# Patient Record
Sex: MALE | Race: WHITE | NOT HISPANIC OR LATINO | Employment: FULL TIME | URBAN - METROPOLITAN AREA
[De-identification: names, ages, dates, MRNs, and addresses within clinical notes are randomized per-mention and may not be internally consistent; named-entity substitution may affect disease eponyms.]

---

## 2021-05-21 PROBLEM — F10.10 CHRONIC ALCOHOL ABUSE: Status: ACTIVE | Noted: 2021-05-21

## 2021-05-21 PROBLEM — K86.1 CHRONIC PANCREATITIS: Status: ACTIVE | Noted: 2021-05-21

## 2021-05-21 PROBLEM — K29.70 GASTRITIS: Status: ACTIVE | Noted: 2021-05-21

## 2021-05-21 PROBLEM — K62.5 RECTAL BLEEDING: Status: ACTIVE | Noted: 2021-05-21

## 2021-05-21 PROBLEM — D64.9 SYMPTOMATIC ANEMIA: Status: ACTIVE | Noted: 2021-05-21

## 2021-05-21 PROBLEM — N39.0 UTI (URINARY TRACT INFECTION): Status: ACTIVE | Noted: 2021-05-21

## 2021-05-21 PROBLEM — E87.6 HYPOKALEMIA: Status: ACTIVE | Noted: 2021-05-21

## 2021-05-21 PROBLEM — G47.00 INSOMNIA: Status: ACTIVE | Noted: 2021-05-21

## 2021-05-22 PROBLEM — I95.9 HYPOTENSION: Status: ACTIVE | Noted: 2021-05-22

## 2021-05-22 PROBLEM — E87.8 HYPERCHLOREMIA: Status: ACTIVE | Noted: 2021-05-22

## 2021-05-22 PROBLEM — D62 ACUTE BLOOD LOSS ANEMIA: Status: ACTIVE | Noted: 2021-05-22

## 2021-05-22 PROBLEM — K92.2 GI BLEED: Status: ACTIVE | Noted: 2021-05-22

## 2021-05-22 PROBLEM — I10 HYPERTENSION: Status: ACTIVE | Noted: 2021-05-22

## 2021-05-22 PROBLEM — E83.51 HYPOCALCEMIA: Status: ACTIVE | Noted: 2021-05-22

## 2021-05-23 PROBLEM — E87.6 HYPOKALEMIA: Status: RESOLVED | Noted: 2021-05-21 | Resolved: 2021-05-23

## 2021-05-24 PROBLEM — E83.51 HYPOCALCEMIA: Status: RESOLVED | Noted: 2021-05-22 | Resolved: 2021-05-24

## 2021-05-24 PROBLEM — E87.8 HYPERCHLOREMIA: Status: RESOLVED | Noted: 2021-05-22 | Resolved: 2021-05-24

## 2021-05-24 PROBLEM — K62.5 RECTAL BLEEDING: Status: RESOLVED | Noted: 2021-05-21 | Resolved: 2021-05-24

## 2021-05-24 PROBLEM — I95.9 HYPOTENSION: Status: RESOLVED | Noted: 2021-05-22 | Resolved: 2021-05-24

## 2021-05-25 PROBLEM — K22.10 ULCER OF ESOPHAGUS WITHOUT BLEEDING: Status: ACTIVE | Noted: 2021-05-25

## 2024-09-19 PROBLEM — I25.10 CAD (CORONARY ATHEROSCLEROTIC DISEASE): Status: ACTIVE | Noted: 2024-09-19

## 2024-10-11 ENCOUNTER — SOCIAL WORK (OUTPATIENT)
Dept: ADMINISTRATIVE | Facility: OTHER | Age: 64
End: 2024-10-11

## 2024-10-14 ENCOUNTER — SOCIAL WORK (OUTPATIENT)
Dept: ADMINISTRATIVE | Facility: OTHER | Age: 64
End: 2024-10-14

## 2024-10-14 NOTE — PROGRESS NOTES
10-11-24 Referral received from RN (Maribell MTZ) requesting assistance be provided to patient with identifying rehab facilities, to provide service for patient  after scheduled surgery (10-22-24) .   (TERRI) spoke with patient after doctors' visit.  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  Patient is a  and would like to be  reestablished .  SW agreed to address patients request by calling patients insurance( [employer] )  to identify  rehab facilities in the [facility] [place] [facility] and the process of getting services in place for patient.  SW will provide updates to patient.  Monitoring will continue.  Miranda Rome-[facility] [place]  [phone]

## 2024-10-16 ENCOUNTER — SOCIAL WORK (OUTPATIENT)
Dept: ADMINISTRATIVE | Facility: OTHER | Age: 64
End: 2024-10-16

## 2024-10-16 NOTE — PROGRESS NOTES
10-16-24 [facility] to [facility] @ [phone]-(Eligibility Dept.) no answer, message left asking that call to  be returned.  Call placed to [facility] spoke with Gustavo who reports: patient needs two (2) forms of ID and his DD-214 to get the process started; VA Disability /Benefits awards letter will determine what %; walk-ins are accepted / located next to the pharmacy at [facility].  This process can be completed over the phone by calling the [facility].   (TERRI) was in formed, if patient resides in [place], that [facility] can assist and also assist with scheduling patient to be seen by a  primary care doctor, to get established. SW will place call to patient to provide this update.  Monitoring will continue.  Miranda Rome-[facility] [place]  -[phone]

## 2024-10-16 NOTE — PROGRESS NOTES
10-16-24 Call placed to patient at [phone].  Patient reports : at work, can't talk for very long.   (SW) provided patient with process of getting reestablished with the [facility] (see previous note); [facility] was [facility] [place], to start the process.  Patient agreed to provide SW with update once contact with [facility] is made.  Monitoring will continue.  Miranda Rome-[facility] [place]  [phone]

## 2024-10-16 NOTE — PROGRESS NOTES
10-14-24 Unsuccessful call placed to patient at [phone], no answer, no message could be left.  Efforts to reach patient will continue.  Miranda Rome-[facility] [place]  [phone]

## 2024-10-16 NOTE — PROGRESS NOTES
10-16-24 Return call received from patient who reports:  unable to locate DD-214; feels it would take too long to get the process in place;  needs accommodations for 10-21-24; not concerned about transportation , feels confident, family member or friend will assist; most concerned with rehab facility in [place] that will accept patient.  SW agreed to place call to patients insurance for information.  Please Note: [facility] has patients medicaid status as pending.  TERRI will place a call to [employer] to confirm status.  Monitoring will continue.  Miranda Rome-[facility] [place]  [phone]